# Patient Record
Sex: MALE | Race: WHITE | NOT HISPANIC OR LATINO | Employment: FULL TIME | URBAN - METROPOLITAN AREA
[De-identification: names, ages, dates, MRNs, and addresses within clinical notes are randomized per-mention and may not be internally consistent; named-entity substitution may affect disease eponyms.]

---

## 2018-04-18 ENCOUNTER — HOSPITAL ENCOUNTER (OUTPATIENT)
Dept: MRI IMAGING | Facility: HOSPITAL | Age: 33
Discharge: HOME | End: 2018-04-18
Attending: PSYCHIATRY & NEUROLOGY | Admitting: PSYCHIATRY & NEUROLOGY

## 2020-04-15 ENCOUNTER — TELEPHONE (OUTPATIENT)
Dept: FAMILY MEDICINE CLINIC | Facility: CLINIC | Age: 35
End: 2020-04-15

## 2020-04-17 DIAGNOSIS — R51.9 NONINTRACTABLE HEADACHE, UNSPECIFIED CHRONICITY PATTERN, UNSPECIFIED HEADACHE TYPE: Primary | ICD-10-CM

## 2020-04-18 RX ORDER — SUMATRIPTAN 25 MG/1
TABLET, FILM COATED ORAL
Qty: 60 TABLET | Refills: 2 | Status: SHIPPED | OUTPATIENT
Start: 2020-04-18 | End: 2022-03-29

## 2021-05-14 ENCOUNTER — OFFICE VISIT (OUTPATIENT)
Dept: FAMILY MEDICINE CLINIC | Facility: CLINIC | Age: 36
End: 2021-05-14

## 2021-05-14 VITALS
HEART RATE: 107 BPM | HEIGHT: 76 IN | OXYGEN SATURATION: 99 % | DIASTOLIC BLOOD PRESSURE: 75 MMHG | BODY MASS INDEX: 25.72 KG/M2 | RESPIRATION RATE: 18 BRPM | SYSTOLIC BLOOD PRESSURE: 110 MMHG | WEIGHT: 211.2 LBS | TEMPERATURE: 98.2 F

## 2021-05-14 DIAGNOSIS — R51.9 NONINTRACTABLE HEADACHE, UNSPECIFIED CHRONICITY PATTERN, UNSPECIFIED HEADACHE TYPE: Primary | ICD-10-CM

## 2021-05-14 PROCEDURE — 99213 OFFICE O/P EST LOW 20 MIN: CPT | Performed by: FAMILY MEDICINE

## 2021-05-14 RX ORDER — CEFPROZIL 500 MG/1
500 TABLET, FILM COATED ORAL 2 TIMES DAILY
Qty: 28 TABLET | Refills: 0 | Status: SHIPPED | OUTPATIENT
Start: 2021-05-14 | End: 2022-03-29

## 2021-05-14 RX ORDER — CYANOCOBALAMIN (VITAMIN B-12) 250 MCG
TABLET ORAL EVERY 24 HOURS
COMMUNITY
Start: 2018-04-04

## 2021-05-14 RX ORDER — SUMATRIPTAN 20 MG/1
1 SPRAY NASAL DAILY
Qty: 1 EACH | Refills: 12 | Status: SHIPPED | OUTPATIENT
Start: 2021-05-14 | End: 2022-03-29

## 2021-05-14 RX ORDER — MONTELUKAST SODIUM 10 MG/1
TABLET ORAL EVERY 24 HOURS
COMMUNITY
Start: 2018-04-04 | End: 2022-03-29

## 2021-05-14 RX ORDER — SUMATRIPTAN 50 MG/1
TABLET, FILM COATED ORAL
Qty: 9 TABLET | Refills: 12 | Status: SHIPPED | OUTPATIENT
Start: 2021-05-14 | End: 2022-05-16

## 2021-05-14 RX ORDER — TRIAMCINOLONE ACETONIDE 55 UG/1
SPRAY, METERED NASAL
COMMUNITY
Start: 2018-04-04 | End: 2022-03-29

## 2021-05-14 NOTE — PROGRESS NOTES
Subjective   Navin Ocampo is a 36 y.o. male.     Pt here for FMLA paperwork and imitrex med refill . Neurology workup neg for etio of headache. Ethmoid sinusitis noted on MRI 2918.  Headaches seasonal in April and occasionally  Oct    Headache   This is a chronic problem. The current episode started more than 1 year ago. The problem occurs daily. The problem has been rapidly worsening. The pain is located in the left unilateral region. The pain does not radiate. The pain is at a severity of 0/10. The patient is experiencing no pain. Associated symptoms include dizziness, nausea and scalp tenderness. Pertinent negatives include no abdominal pain, back pain, blurred vision, coughing, loss of balance, sinus pressure, swollen glands, visual change or vomiting. The symptoms are aggravated by alcohol. Treatments tried: imitrex.        The following portions of the patient's history were reviewed and updated as appropriate: allergies, current medications, past family history, past medical history, past social history, past surgical history and problem list.    Patient Active Problem List   Diagnosis   • Body mass index (BMI) of 25.0-25.9 in adult   • Headache       Current Outpatient Medications on File Prior to Visit   Medication Sig Dispense Refill   • cyanocobalamin (VITAMIN B-12) 250 MCG tablet Daily.     • montelukast (Singulair) 10 MG tablet Daily.     • SUMAtriptan (Imitrex) 25 MG tablet Take one tablet at onset of headache. May repeat dose one time in 2 hours if headache not relieved. 60 tablet 2   • Triamcinolone Acetonide (Nasacort Allergy 24HR) 55 MCG/ACT nasal inhaler NASACORT ALLERGY 24HR 55 MCG/ACT AERO       No current facility-administered medications on file prior to visit.     Current outpatient and discharge medications have been reconciled for the patient.  Reviewed by: Caleb Pickard MD      No Known Allergies    Review of Systems   HENT: Negative for sinus pressure and swollen glands.     Eyes: Negative for blurred vision.   Respiratory: Negative for cough.    Gastrointestinal: Positive for nausea. Negative for abdominal pain and vomiting.   Musculoskeletal: Negative for back pain.   Neurological: Positive for dizziness. Negative for loss of balance.     I have reviewed and confirmed the accuracy of the ROS as documented by the MA/LPN/RN Caleb Pickard MD    Objective   Vitals:    05/14/21 1354   BP: 110/75   Pulse: 107   Resp: 18   Temp: 98.2 °F (36.8 °C)   SpO2: 99%     Physical Exam  Constitutional:       Appearance: He is well-developed.   HENT:      Head: Normocephalic and atraumatic.      Right Ear: External ear normal.      Left Ear: External ear normal.      Nose: Nose normal.   Eyes:      Pupils: Pupils are equal, round, and reactive to light.   Cardiovascular:      Rate and Rhythm: Normal rate and regular rhythm.      Heart sounds: Normal heart sounds.   Pulmonary:      Effort: Pulmonary effort is normal.      Breath sounds: Normal breath sounds.   Abdominal:      General: Bowel sounds are normal.      Palpations: Abdomen is soft.   Musculoskeletal:         General: Normal range of motion.      Cervical back: Normal range of motion and neck supple.   Skin:     General: Skin is warm and dry.   Neurological:      Mental Status: He is alert and oriented to person, place, and time.   Psychiatric:         Behavior: Behavior normal.         Thought Content: Thought content normal.         Judgment: Judgment normal.         Assessment/Plan .  Problem List Items Addressed This Visit     Headache - Primary    Relevant Medications    SUMAtriptan (IMITREX) 20 MG/ACT nasal spray    SUMAtriptan (Imitrex) 50 MG tablet    cefprozil (CEFZIL) 500 MG tablet      Try empiric atbx  Follow-up in 2 weeks if not better.  Follow-up sooner for worsening symptoms or for any concerns.  Follow-up for routine health maintenance as directed.  Forms completed today     I wore protective equipment throughout  this patient encounter to include mask and eye protection. Hand hygiene was performed before donning protective equipment and after removal when leaving the room

## 2022-03-29 ENCOUNTER — OFFICE VISIT (OUTPATIENT)
Dept: FAMILY MEDICINE CLINIC | Facility: CLINIC | Age: 37
End: 2022-03-29

## 2022-03-29 VITALS
RESPIRATION RATE: 18 BRPM | HEART RATE: 78 BPM | HEIGHT: 76 IN | DIASTOLIC BLOOD PRESSURE: 66 MMHG | OXYGEN SATURATION: 99 % | TEMPERATURE: 97.8 F | BODY MASS INDEX: 25.94 KG/M2 | SYSTOLIC BLOOD PRESSURE: 115 MMHG | WEIGHT: 213 LBS

## 2022-03-29 DIAGNOSIS — G43.009 MIGRAINE WITHOUT AURA AND WITHOUT STATUS MIGRAINOSUS, NOT INTRACTABLE: ICD-10-CM

## 2022-03-29 DIAGNOSIS — R31.0 GROSS HEMATURIA: Primary | ICD-10-CM

## 2022-03-29 LAB
BILIRUB BLD-MCNC: NEGATIVE MG/DL
CLARITY, POC: ABNORMAL
COLOR UR: YELLOW
GLUCOSE UR STRIP-MCNC: NEGATIVE MG/DL
KETONES UR QL: NEGATIVE
LEUKOCYTE EST, POC: NEGATIVE
NITRITE UR-MCNC: NEGATIVE MG/ML
PH UR: 5 [PH] (ref 5–8)
PROT UR STRIP-MCNC: ABNORMAL MG/DL
RBC # UR STRIP: ABNORMAL /UL
SP GR UR: 1.01 (ref 1–1.03)
UROBILINOGEN UR QL: NORMAL

## 2022-03-29 PROCEDURE — 81003 URINALYSIS AUTO W/O SCOPE: CPT | Performed by: FAMILY MEDICINE

## 2022-03-29 PROCEDURE — 99213 OFFICE O/P EST LOW 20 MIN: CPT | Performed by: FAMILY MEDICINE

## 2022-03-29 RX ORDER — CIPROFLOXACIN 500 MG/1
500 TABLET, FILM COATED ORAL 2 TIMES DAILY
Qty: 20 TABLET | Refills: 0 | Status: SHIPPED | OUTPATIENT
Start: 2022-03-29 | End: 2022-04-12

## 2022-03-29 NOTE — ASSESSMENT & PLAN NOTE
Will treat with cipro, hold on CT scan since he is not having pain c/w renal stones.   Will check micro and send for culture. Added gonorrhea and chlamydia labs also  Recheck in 2 weeks

## 2022-03-29 NOTE — PROGRESS NOTES
Subjective   Navin Ocampo is a 36 y.o. male. Presents to [facility]    Chief Complaint   Patient presents with   • Blood in Urine       Blood in Urine  This is a new problem. The current episode started in the past 7 days. The problem has been gradually worsening since onset. He describes the hematuria as gross hematuria. He reports no clotting in his urine stream. The pain is mild. He describes his urine color as yellow (light red first few days). Irritative symptoms include frequency and urgency. Associated symptoms include bladder pain and dysuria. Pertinent negatives include no abdominal pain, bone pain, fever, flank pain, genital pain, inability to urinate, nausea or vomiting. He is sexually active (no new partners for 18 months).    No fever, no nausea. He has had some discomfort. He noticed blood in is urine last week. It has lightened up since then. Mostly painless.     I personally reviewed and updated the patient's allergies, medications, problem list, and past medical, surgical, social, and family history. I have reviewed and confirmed the accuracy of the History of Present Illness and Review of Symptoms as documented by the MA/LPN/RN. Monica Jiménez MD    Allergies:  No Known Allergies    Social History:  Social History     Socioeconomic History   • Marital status: Single   Tobacco Use   • Smoking status: Current Every Day Smoker     Packs/day: 1.00     Years: 15.00     Pack years: 15.00     Types: Cigarettes   • Smokeless tobacco: Never Used   Substance and Sexual Activity   • Alcohol use: Yes     Comment: occ   • Drug use: Yes     Types: Marijuana   • Sexual activity: Defer       Family History:  Family History   Problem Relation Age of Onset   • No Known Problems Mother    • Hypertension Father        Past Medical History :  Patient Active Problem List   Diagnosis   • Body mass index (BMI) of 25.0-25.9 in adult   • Headache   • Gross hematuria   • Migraine without aura and without  "status migrainosus, not intractable       Medication List:    Current Outpatient Medications:   •  cyanocobalamin (VITAMIN B-12) 250 MCG tablet, Daily., Disp: , Rfl:   •  SUMAtriptan (Imitrex) 50 MG tablet, Take one tablet at onset of headache. May repeat dose one time in 2 hours if headache not relieved., Disp: 9 tablet, Rfl: 12  •  ciprofloxacin (CIPRO) 500 MG tablet, Take 1 tablet by mouth 2 (Two) Times a Day., Disp: 20 tablet, Rfl: 0    Past Surgical History:  Past Surgical History:   Procedure Laterality Date   • HAND SURGERY Right 2009       Review of Systems:  Review of Systems   Constitutional: Negative for activity change and fever.   HENT: Negative for ear pain, rhinorrhea, sinus pressure and voice change.    Eyes: Negative for visual disturbance.   Respiratory: Negative for cough and shortness of breath.    Cardiovascular: Negative for chest pain.   Gastrointestinal: Negative for abdominal pain, diarrhea, nausea and vomiting.   Endocrine: Negative for cold intolerance and heat intolerance.   Genitourinary: Positive for dysuria, frequency, hematuria and urgency. Negative for flank pain.   Musculoskeletal: Negative for arthralgias.   Skin: Negative for rash.   Neurological: Negative for syncope.   Hematological: Does not bruise/bleed easily.   Psychiatric/Behavioral: Negative for depressed mood. The patient is not nervous/anxious.        Physical Exam:  Vital Signs:  Vital Signs:   /66   Pulse 78   Temp 97.8 °F (36.6 °C)   Resp 18   Ht 193 cm (76\")   Wt 96.6 kg (213 lb)   SpO2 99%   BMI 25.93 kg/m²     Result Review :   The following data was reviewed by: Monica Jiménez MD on 03/29/2022:           Brief Urine Lab Results  (Last result in the past 365 days)      Color   Clarity   Blood   Leuk Est   Nitrite   Protein   CREAT   Urine HCG        03/29/22 1352 Yellow   Cloudy   50 Jayden/ul   Negative   Negative   1+                 Physical Exam  Vitals reviewed.   Constitutional:       Appearance: " Normal appearance. He is well-developed.   HENT:      Head: Normocephalic and atraumatic.   Eyes:      General:         Right eye: No discharge.         Left eye: No discharge.   Cardiovascular:      Rate and Rhythm: Normal rate and regular rhythm.      Heart sounds: Normal heart sounds. No murmur heard.    No friction rub. No gallop.   Pulmonary:      Effort: Pulmonary effort is normal. No respiratory distress.      Breath sounds: Normal breath sounds. No wheezing or rales.   Abdominal:      General: Abdomen is flat. Bowel sounds are normal. There is no distension.      Palpations: Abdomen is soft. There is no mass.      Tenderness: There is no abdominal tenderness. There is no right CVA tenderness, left CVA tenderness, guarding or rebound.      Hernia: No hernia is present.   Skin:     General: Skin is warm and dry.      Findings: No rash.   Neurological:      Mental Status: He is alert and oriented to person, place, and time.      Coordination: Coordination normal.      Gait: Gait normal.   Psychiatric:         Behavior: Behavior is cooperative.         Assessment and Plan:  Problems Addressed this Visit        Genitourinary and Reproductive     Gross hematuria - Primary     Will treat with cipro, hold on CT scan since he is not having pain c/w renal stones.   Will check micro and send for culture. Added gonorrhea and chlamydia labs also  Recheck in 2 weeks           Relevant Medications    ciprofloxacin (CIPRO) 500 MG tablet    Other Relevant Orders    POCT urinalysis dipstick, multipro (Completed)    Urine Culture - Urine, Urine, Random Void    Urinalysis With Microscopic - Urine, Random Void    Chlamydia trachomatis, Neisseria gonorrhoeae, PCR - Urine, Urine, Clean Catch       Neuro    Migraine without aura and without status migrainosus, not intractable     He asked for referral to neurologist             Relevant Orders    Ambulatory Referral to Neurology      Diagnoses       Codes Comments    Gross  hematuria    -  Primary ICD-10-CM: R31.0  ICD-9-CM: 599.71     Migraine without aura and without status migrainosus, not intractable     ICD-10-CM: G43.009  ICD-9-CM: 346.10            An After Visit Summary and PPPS were given to the patient.       I wore protective equipment throughout this patient encounter to include mask. Hand hygiene was performed before donning protective equipment and after removal when leaving the room.

## 2022-03-30 LAB
APPEARANCE UR: CLEAR
BACTERIA #/AREA URNS HPF: NORMAL /[HPF]
BILIRUB UR QL STRIP: NEGATIVE
CASTS URNS QL MICRO: NORMAL /LPF
COLOR UR: YELLOW
EPI CELLS #/AREA URNS HPF: NORMAL /HPF (ref 0–10)
GLUCOSE UR QL STRIP: NEGATIVE
HGB UR QL STRIP: ABNORMAL
KETONES UR QL STRIP: NEGATIVE
LEUKOCYTE ESTERASE UR QL STRIP: NEGATIVE
MICRO URNS: ABNORMAL
NITRITE UR QL STRIP: NEGATIVE
PH UR STRIP: 6 [PH] (ref 5–7.5)
PROT UR QL STRIP: NEGATIVE
RBC #/AREA URNS HPF: NORMAL /HPF (ref 0–2)
SP GR UR STRIP: 1.01 (ref 1–1.03)
UROBILINOGEN UR STRIP-MCNC: 0.2 MG/DL (ref 0.2–1)
WBC #/AREA URNS HPF: NORMAL /HPF (ref 0–5)

## 2022-03-31 LAB
BACTERIA UR CULT: NO GROWTH
BACTERIA UR CULT: NORMAL
C TRACH RRNA SPEC QL NAA+PROBE: NEGATIVE
N GONORRHOEA RRNA SPEC QL NAA+PROBE: NEGATIVE

## 2022-04-12 ENCOUNTER — OFFICE VISIT (OUTPATIENT)
Dept: FAMILY MEDICINE CLINIC | Facility: CLINIC | Age: 37
End: 2022-04-12

## 2022-04-12 VITALS
DIASTOLIC BLOOD PRESSURE: 60 MMHG | OXYGEN SATURATION: 98 % | HEART RATE: 74 BPM | WEIGHT: 209 LBS | HEIGHT: 76 IN | TEMPERATURE: 97.8 F | SYSTOLIC BLOOD PRESSURE: 92 MMHG | BODY MASS INDEX: 25.45 KG/M2 | RESPIRATION RATE: 18 BRPM

## 2022-04-12 DIAGNOSIS — R31.0 GROSS HEMATURIA: Primary | ICD-10-CM

## 2022-04-12 LAB
BILIRUB BLD-MCNC: NEGATIVE MG/DL
CLARITY, POC: CLEAR
COLOR UR: YELLOW
GLUCOSE UR STRIP-MCNC: NEGATIVE MG/DL
KETONES UR QL: NEGATIVE
LEUKOCYTE EST, POC: NEGATIVE
NITRITE UR-MCNC: NEGATIVE MG/ML
PH UR: 6.5 [PH] (ref 5–8)
PROT UR STRIP-MCNC: NEGATIVE MG/DL
RBC # UR STRIP: ABNORMAL /UL
SP GR UR: 1.01 (ref 1–1.03)
UROBILINOGEN UR QL: NORMAL

## 2022-04-12 PROCEDURE — 99213 OFFICE O/P EST LOW 20 MIN: CPT | Performed by: FAMILY MEDICINE

## 2022-04-12 PROCEDURE — 81002 URINALYSIS NONAUTO W/O SCOPE: CPT | Performed by: FAMILY MEDICINE

## 2022-04-12 NOTE — PROGRESS NOTES
Subjective   Navin Ocampo is a 36 y.o. male.   No chief complaint on file.      History of hematuria workup approx 5 years ago     Blood in Urine  This is a recurrent problem. The current episode started 1 to 4 weeks ago. The problem has been gradually worsening since onset. He describes the hematuria as gross hematuria. He reports no clotting in his urine stream. The pain is mild. He describes his urine color as yellow (light red first few days). Irritative symptoms do not include frequency or urgency. Associated symptoms include bladder pain. Pertinent negatives include no abdominal pain, bone pain, dysuria, fever, flank pain, genital pain, inability to urinate, nausea or vomiting. He is sexually active (no new partners for 18 months).        The following portions of the patient's history were reviewed and updated as appropriate: allergies, current medications, past family history, past medical history, past social history, past surgical history and problem list.    Patient Active Problem List   Diagnosis   • Body mass index (BMI) of 25.0-25.9 in adult   • Headache   • Gross hematuria   • Migraine without aura and without status migrainosus, not intractable       Current Outpatient Medications on File Prior to Visit   Medication Sig Dispense Refill   • cyanocobalamin (VITAMIN B-12) 250 MCG tablet Daily.     • SUMAtriptan (Imitrex) 50 MG tablet Take one tablet at onset of headache. May repeat dose one time in 2 hours if headache not relieved. 9 tablet 12   • [DISCONTINUED] ciprofloxacin (CIPRO) 500 MG tablet Take 1 tablet by mouth 2 (Two) Times a Day. 20 tablet 0     No current facility-administered medications on file prior to visit.     Current outpatient and discharge medications have been reconciled for the patient.  Reviewed by: Caleb Pickard MD      No Known Allergies    Review of Systems   Constitutional: Negative for fever.   Gastrointestinal: Negative for abdominal pain, nausea and vomiting.  "  Genitourinary: Negative for dysuria, flank pain, frequency, hematuria and urgency.     I have reviewed and confirmed the accuracy of the ROS as documented by the MA/LPN/RN Caleb Pickard MD    Objective   Visit Vitals  BP 92/60 (BP Location: Right arm, Patient Position: Sitting, Cuff Size: Adult)   Pulse 74   Temp 97.8 °F (36.6 °C)   Resp 18   Ht 193 cm (76\")   Wt 94.8 kg (209 lb)   SpO2 98%   BMI 25.44 kg/m²       Physical Exam  Constitutional:       Appearance: He is well-developed.   HENT:      Head: Normocephalic and atraumatic.      Right Ear: External ear normal.      Left Ear: External ear normal.      Nose: Nose normal.   Eyes:      Pupils: Pupils are equal, round, and reactive to light.   Cardiovascular:      Rate and Rhythm: Normal rate and regular rhythm.      Heart sounds: Normal heart sounds.   Pulmonary:      Effort: Pulmonary effort is normal.      Breath sounds: Normal breath sounds.   Abdominal:      General: Bowel sounds are normal.      Palpations: Abdomen is soft.   Musculoskeletal:         General: Normal range of motion.      Cervical back: Normal range of motion and neck supple.   Skin:     General: Skin is warm and dry.   Neurological:      Mental Status: He is alert and oriented to person, place, and time.   Psychiatric:         Behavior: Behavior normal.         Thought Content: Thought content normal.         Judgment: Judgment normal.       Derm Physical Exam    Assessment/Plan .    Diagnoses and all orders for this visit:    1. Gross hematuria (Primary)  Comments:  recheck ua in 1 month. Will get urology records   Orders:  -     POCT urinalysis dipstick, manual  -     Urinalysis With Microscopic - Urine, Clean Catch; Future     Findings discussed. All questions answered.  Medication and medication adverse effects discussed.  Drug education given and explained to patient. Patient verbalized understanding.  Follow-up for routine health maintenance as directed   Follow up in 1 " month for reevaluation, sooner for concerns.      I wore protective equipment throughout this patient encounter to include mask and gloves. Hand hygiene was performed before donning protective equipment and after removal when leaving the room

## 2022-05-16 DIAGNOSIS — R51.9 NONINTRACTABLE HEADACHE, UNSPECIFIED CHRONICITY PATTERN, UNSPECIFIED HEADACHE TYPE: ICD-10-CM

## 2022-05-16 RX ORDER — SUMATRIPTAN 50 MG/1
TABLET, FILM COATED ORAL
Qty: 9 TABLET | Refills: 12 | Status: SHIPPED | OUTPATIENT
Start: 2022-05-16 | End: 2022-07-11 | Stop reason: SDUPTHER

## 2022-05-18 LAB
APPEARANCE UR: CLEAR
BACTERIA #/AREA URNS HPF: NORMAL /[HPF]
BILIRUB UR QL STRIP: NEGATIVE
CASTS URNS QL MICRO: NORMAL /LPF
COLOR UR: YELLOW
EPI CELLS #/AREA URNS HPF: NORMAL /HPF (ref 0–10)
GLUCOSE UR QL STRIP: NEGATIVE
HGB UR QL STRIP: ABNORMAL
KETONES UR QL STRIP: NEGATIVE
LEUKOCYTE ESTERASE UR QL STRIP: NEGATIVE
MICRO URNS: ABNORMAL
NITRITE UR QL STRIP: NEGATIVE
PH UR STRIP: 6.5 [PH] (ref 5–7.5)
PROT UR QL STRIP: NEGATIVE
RBC #/AREA URNS HPF: NORMAL /HPF (ref 0–2)
SP GR UR STRIP: 1.01 (ref 1–1.03)
UROBILINOGEN UR STRIP-MCNC: 0.2 MG/DL (ref 0.2–1)
WBC #/AREA URNS HPF: NORMAL /HPF (ref 0–5)

## 2022-06-06 ENCOUNTER — TELEPHONE (OUTPATIENT)
Dept: FAMILY MEDICINE CLINIC | Facility: CLINIC | Age: 37
End: 2022-06-06

## 2022-06-06 NOTE — TELEPHONE ENCOUNTER
Caller: Navin Ocampo    Relationship: Self    Best call back number: [phone]    What test was performed: URINE SAMPLE    When was the test performed: 5/17/22    Where was the test performed: [facility]    Additional notes: PATIENT CALLED TO CHECK ON RESULTS, NEVER HEARD ANYTHING BACK AFTER COMPLETING SAMPLE.    PLEASE ADVISE

## 2022-06-13 ENCOUNTER — OFFICE VISIT (OUTPATIENT)
Dept: FAMILY MEDICINE CLINIC | Facility: CLINIC | Age: 37
End: 2022-06-13

## 2022-06-13 VITALS
HEIGHT: 76 IN | DIASTOLIC BLOOD PRESSURE: 68 MMHG | WEIGHT: 207.8 LBS | RESPIRATION RATE: 18 BRPM | SYSTOLIC BLOOD PRESSURE: 106 MMHG | HEART RATE: 79 BPM | TEMPERATURE: 97.3 F | BODY MASS INDEX: 25.31 KG/M2 | OXYGEN SATURATION: 98 %

## 2022-06-13 DIAGNOSIS — J30.89 NON-SEASONAL ALLERGIC RHINITIS DUE TO OTHER ALLERGIC TRIGGER: ICD-10-CM

## 2022-06-13 DIAGNOSIS — R51.9 INTRACTABLE HEADACHE, UNSPECIFIED CHRONICITY PATTERN, UNSPECIFIED HEADACHE TYPE: Primary | ICD-10-CM

## 2022-06-13 PROCEDURE — 99213 OFFICE O/P EST LOW 20 MIN: CPT | Performed by: FAMILY MEDICINE

## 2022-06-13 RX ORDER — AZELASTINE HCL 205.5 UG/1
1 SPRAY NASAL 2 TIMES DAILY
Qty: 30 ML | Refills: 5 | Status: SHIPPED | OUTPATIENT
Start: 2022-06-13

## 2022-06-13 RX ORDER — MONTELUKAST SODIUM 10 MG/1
10 TABLET ORAL NIGHTLY
Qty: 30 TABLET | Refills: 12 | Status: SHIPPED | OUTPATIENT
Start: 2022-06-13 | End: 2022-07-05

## 2022-06-13 NOTE — PROGRESS NOTES
Subjective   Navin Ocampo is a 37 y.o. male.   Chief Complaint   Patient presents with   • Headache       Headache  Headache pattern:  Headache sometimes there, sometimes not at all  Recent changes:  Headaches come more often than they used to and headaches last longer than they used to  Frequency:  More than 3 per week  Providers seen:  Primary care provider  Do headaches wake patient from sleep?: Yes    Days of the week symptoms are worse:  No specific day of the week  Quality:  Throbbing and squeezing  Aggravating factors:  Activity, light and noise  Abortive medications tried:  Sumatriptan  Treatments tried: imitrex.       The following portions of the patient's history were reviewed and updated as appropriate: allergies, current medications, past family history, past medical history, past social history, past surgical history and problem list.    Patient Active Problem List   Diagnosis   • Body mass index (BMI) of 25.0-25.9 in adult   • Headache   • Gross hematuria   • Migraine without aura and without status migrainosus, not intractable       Current Outpatient Medications on File Prior to Visit   Medication Sig Dispense Refill   • cyanocobalamin (VITAMIN B-12) 250 MCG tablet Daily.     • SUMAtriptan (IMITREX) 50 MG tablet TAKE ONE TABLET AT ONSET OF HEADACHE. MAY REPEAT DOSE ONE TIME IN 2 HOURS IF HEADACHE NOT RELIEVED. 9 tablet 12     No current facility-administered medications on file prior to visit.     Current outpatient and discharge medications have been reconciled for the patient.  Reviewed by: Caleb Pickard MD      No Known Allergies    Review of Systems   Constitutional: Negative for activity change, appetite change, fatigue and fever.   HENT: Positive for congestion and sinus pressure. Negative for ear pain, swollen glands and voice change.    Eyes: Negative for visual disturbance.   Respiratory: Negative for shortness of breath and wheezing.    Cardiovascular: Negative for chest pain and  "leg swelling.   Gastrointestinal: Negative for abdominal pain, blood in stool, constipation, diarrhea, nausea and vomiting.   Endocrine: Negative for polydipsia and polyuria.   Genitourinary: Negative for dysuria, frequency and hematuria.   Musculoskeletal: Negative for joint swelling, neck pain and neck stiffness.   Skin: Negative for rash and wound.   Neurological: Negative for weakness, numbness and headache.   Psychiatric/Behavioral: Negative for suicidal ideas and depressed mood.     I have reviewed and confirmed the accuracy of the ROS as documented by the MA/LPN/RN Caleb Pickard MD    Objective   Visit Vitals  /68 (BP Location: Right arm, Patient Position: Sitting, Cuff Size: Adult)   Pulse 79   Temp 97.3 °F (36.3 °C)   Resp 18   Ht 193 cm (76\")   Wt 94.3 kg (207 lb 12.8 oz)   SpO2 98%   BMI 25.29 kg/m²       Physical Exam  Constitutional:       Appearance: He is well-developed.   HENT:      Head: Normocephalic and atraumatic.      Right Ear: External ear normal.      Left Ear: External ear normal.      Nose:      Left Sinus: Maxillary sinus tenderness and frontal sinus tenderness present.   Eyes:      Pupils: Pupils are equal, round, and reactive to light.   Cardiovascular:      Rate and Rhythm: Normal rate and regular rhythm.      Heart sounds: Normal heart sounds.   Pulmonary:      Effort: Pulmonary effort is normal.      Breath sounds: Normal breath sounds.   Abdominal:      General: Bowel sounds are normal.      Palpations: Abdomen is soft.   Musculoskeletal:         General: Normal range of motion.      Cervical back: Normal range of motion and neck supple.   Skin:     General: Skin is warm and dry.   Neurological:      Mental Status: He is alert and oriented to person, place, and time.   Psychiatric:         Behavior: Behavior normal.         Thought Content: Thought content normal.         Judgment: Judgment normal.       Derm Physical Exam    Diagnoses and all orders for this " visit:    1. Intractable headache, unspecified chronicity pattern, unspecified headache type (Primary)  Comments:  ? cluster. Consider AR trigger    2. Non-seasonal allergic rhinitis due to other allergic trigger  -     montelukast (SINGULAIR) 10 MG tablet; Take 1 tablet by mouth Every Night.  Dispense: 30 tablet; Refill: 12  -     azelastine (ASTEPRO) 0.15 % solution nasal spray; 1 spray into the nostril(s) as directed by provider 2 (Two) Times a Day.  Dispense: 30 mL; Refill: 5     Findings discussed. All questions answered.  Medication and medication adverse effects discussed.  Drug education given and explained to patient. Patient verbalized understanding.  Consider gabapentin if sx persist.     I wore protective equipment throughout this patient encounter to include mask and eye protection. Hand hygiene was performed before donning protective equipment and after removal when leaving the room

## 2022-07-05 DIAGNOSIS — J30.89 NON-SEASONAL ALLERGIC RHINITIS DUE TO OTHER ALLERGIC TRIGGER: ICD-10-CM

## 2022-07-05 RX ORDER — MONTELUKAST SODIUM 10 MG/1
TABLET ORAL
Qty: 30 TABLET | Refills: 12 | Status: SHIPPED | OUTPATIENT
Start: 2022-07-05

## 2022-07-07 NOTE — PROGRESS NOTES
Subjective:     Patient ID: Navin Ocampo is a 37 y.o. male.    CHIEF COMPLAINT:Cluster Migriane    History of Present Illness: Mr. Ocampo is a 37 year old  male with a BMI of 25.12 who presented alone. The patient states that he started having episodic headaches left severe temporal stabbing pain, with tearing and rhinorrhea on the left side from April to July since 2018.  He reports that he has had allergy testing and trials with several allergy meds with no change.        Sharp shooting left temporal   Complaint: Cluster  Onset:   Aura: left side of face tingle 10-15 min prior to headache is severe  Location:  Always left temporal area  Quality: Sharp shooting   Severity: 10/10  Duration:   20-30 min   Frequency:  April  Through June, July  Timing: Sporadic during day and night   Context: no known triggers  Modifying factors: Imitrex  Associated Signs and symptoms:  Rhinorrhea and Tearing in Left Temporal  Current meds: Imitrex 50mg       PROCEDURE:  MR BRAIN WO (ROUTINE)   HISTORY:  h/a left-sided headache  IMPRESSION:  1. No acute intracranial abnormality.  2. Chronic right frontal and bilateral ethmoid sinusitis.  Farhan Singh MD         The following portions of the patient's history were reviewed and updated as appropriate: allergies, current medications, past family history, past medical history, past social history, past surgical history and problem list.      Family History   Problem Relation Age of Onset   • No Known Problems Mother    • Hypertension Father        Past Medical History:   Diagnosis Date   • Headaches, cluster        Social History     Socioeconomic History   • Marital status: Single   Tobacco Use   • Smoking status: Current Every Day Smoker     Packs/day: 1.00     Years: 15.00     Pack years: 15.00     Types: Cigarettes, Cigarettes   • Smokeless tobacco: Never Used   Substance and Sexual Activity   • Alcohol use: Not Currently     Comment: occ   • Drug use: Yes     Types:  Marijuana   • Sexual activity: Yes         Current Outpatient Medications:   •  azelastine (ASTEPRO) 0.15 % solution nasal spray, 1 spray into the nostril(s) as directed by provider 2 (Two) Times a Day., Disp: 30 mL, Rfl: 5  •  cyanocobalamin (VITAMIN B-12) 250 MCG tablet, Daily., Disp: , Rfl:   •  montelukast (SINGULAIR) 10 MG tablet, TAKE 1 TABLET BY MOUTH EVERY DAY AT NIGHT, Disp: 30 tablet, Rfl: 12  •  SUMAtriptan (IMITREX) 50 MG tablet, Take one tablet at onset of headache. May repeat dose one time in 2 hours if headache not relieved., Disp: 18 tablet, Rfl: 12  •  verapamil (CALAN) 40 MG tablet, Take 1 pill qd for 2 weeks, then 2 tabs qd, Disp: 60 tablet, Rfl: 3    Review of Systems   Constitutional: Positive for appetite change and chills.   HENT: Positive for congestion, facial swelling, sinus pressure and sneezing.    Eyes: Positive for pain, redness and visual disturbance.   Musculoskeletal: Positive for back pain, neck pain and neck stiffness.   Skin: Positive for color change.   Neurological: Positive for dizziness, weakness, light-headedness, numbness and headaches.   Psychiatric/Behavioral: Positive for agitation, behavioral problems, dysphoric mood and sleep disturbance. The patient is nervous/anxious and is hyperactive.         I have reviewed ROS completed by medical assistant.     Objective:    Neurologic Exam     Mental Status   Oriented to person, place, and time.   Speech: speech is normal     Cranial Nerves     CN III, IV, VI   Pupils are equal, round, and reactive to light.    Gait, Coordination, and Reflexes     Gait  Gait: normal    Coordination   Finger to nose coordination: normal  Tandem walking coordination: normal    Reflexes   Right brachioradialis: 1+  Left brachioradialis: 1+  Right biceps: 1+  Left biceps: 1+  Right triceps: 1+  Left triceps: 1+  Right patellar: 1+  Left patellar: 1+  Right achilles: 2+  Left achilles: 2+      Physical Exam  Vitals and nursing note reviewed.    Constitutional:       Appearance: Normal appearance. He is well-developed.   HENT:      Head: Normocephalic.      Nose: Nose normal.      Mouth/Throat:      Mouth: Mucous membranes are moist.   Eyes:      General: Lids are normal. No visual field deficit.     Extraocular Movements: Extraocular movements intact.      Pupils: Pupils are equal, round, and reactive to light.   Cardiovascular:      Rate and Rhythm: Normal rate and regular rhythm.      Heart sounds: Normal heart sounds, S1 normal and S2 normal.   Pulmonary:      Effort: Pulmonary effort is normal.      Breath sounds: Normal breath sounds.   Musculoskeletal:         General: Normal range of motion.      Cervical back: Full passive range of motion without pain.      Comments: 5/5 all extremities    Skin:     General: Skin is warm.   Neurological:      General: No focal deficit present.      Mental Status: He is alert and oriented to person, place, and time.      Cranial Nerves: Cranial nerves are intact. No cranial nerve deficit, dysarthria or facial asymmetry.      Sensory: Sensation is intact. No sensory deficit.      Motor: Motor function is intact. No weakness, tremor, atrophy, abnormal muscle tone, seizure activity or pronator drift.      Coordination: Coordination is intact. Romberg sign negative. Coordination normal. Finger-Nose-Finger Test and Heel to Shin Test normal. Rapid alternating movements normal.      Gait: Gait is intact. Gait and tandem walk normal.      Deep Tendon Reflexes: Reflexes normal. Babinski sign absent on the right side. Babinski sign absent on the left side.      Reflex Scores:       Tricep reflexes are 1+ on the right side and 1+ on the left side.       Bicep reflexes are 1+ on the right side and 1+ on the left side.       Brachioradialis reflexes are 1+ on the right side and 1+ on the left side.       Patellar reflexes are 1+ on the right side and 1+ on the left side.       Achilles reflexes are 2+ on the right side and 2+  on the left side.  Psychiatric:         Attention and Perception: Attention normal.         Mood and Affect: Mood normal.         Speech: Speech normal.         Behavior: Behavior normal. Behavior is cooperative.         Assessment/Plan:    Diagnoses and all orders for this visit:    1. Migraine-cluster headache syndrome (Primary)  Comments:  Oxygen (100 percent) is administered via a nonrebreathing facial mask with a flow rate of at least 12 L/minute with the patient in a sitting, upright position [  Orders:  -     verapamil (CALAN) 40 MG tablet; Take 1 pill qd for 2 weeks, then 2 tabs qd  Dispense: 60 tablet; Refill: 3  -     Oxygen Therapy  -     SUMAtriptan (IMITREX) 50 MG tablet; Take one tablet at onset of headache. May repeat dose one time in 2 hours if headache not relieved.  Dispense: 18 tablet; Refill: 12        Return in about 3 months (around 10/11/2022) for Next scheduled follow up Rosi Potts.    I spent 53 minutes caring for Navin on this date of service. This time includes time spent by me in the following activities: obtaining and/or reviewing a separately obtained history, performing a medically appropriate examination and/or evaluation, counseling and educating the patient/family/caregiver, ordering medications, tests, or procedures, referring and communicating with other health care professionals and documenting information in the medical record.      This document has been electronically signed by Rosi BAER on July 11, 2022 13:52 EDT

## 2022-07-11 ENCOUNTER — OFFICE VISIT (OUTPATIENT)
Dept: NEUROLOGY | Facility: CLINIC | Age: 37
End: 2022-07-11

## 2022-07-11 VITALS
SYSTOLIC BLOOD PRESSURE: 120 MMHG | TEMPERATURE: 98.4 F | DIASTOLIC BLOOD PRESSURE: 74 MMHG | BODY MASS INDEX: 25.13 KG/M2 | HEART RATE: 58 BPM | HEIGHT: 76 IN | WEIGHT: 206.4 LBS

## 2022-07-11 DIAGNOSIS — G44.009 MIGRAINE-CLUSTER HEADACHE SYNDROME: Primary | ICD-10-CM

## 2022-07-11 PROCEDURE — 99204 OFFICE O/P NEW MOD 45 MIN: CPT | Performed by: NURSE PRACTITIONER

## 2022-07-11 RX ORDER — SUMATRIPTAN 50 MG/1
TABLET, FILM COATED ORAL
Qty: 18 TABLET | Refills: 12 | Status: SHIPPED | OUTPATIENT
Start: 2022-07-11 | End: 2022-11-22 | Stop reason: SDUPTHER

## 2022-07-11 RX ORDER — VERAPAMIL HYDROCHLORIDE 40 MG/1
TABLET ORAL
Qty: 60 TABLET | Refills: 3 | Status: SHIPPED | OUTPATIENT
Start: 2022-07-11 | End: 2022-08-04

## 2022-08-03 DIAGNOSIS — G44.009 MIGRAINE-CLUSTER HEADACHE SYNDROME: ICD-10-CM

## 2022-08-04 RX ORDER — VERAPAMIL HYDROCHLORIDE 40 MG/1
TABLET ORAL
Qty: 60 TABLET | Refills: 3 | Status: SHIPPED | OUTPATIENT
Start: 2022-08-04 | End: 2022-11-22 | Stop reason: SDUPTHER

## 2022-11-18 NOTE — PROGRESS NOTES
Subjective: Cluster Migriane    Patient ID: Navin Ocampo is a 37 y.o. male.    History of Present Illness: Mr. Ocampo is a very pleasant 37-year-old  male with a BMI of 25.32 who presented today for a follow-up on cluster migraine.  The patient states he has been doing very well.  He states he has had 1 cluster since last seen which was in July 2022.  He is currently treated with verapamil, sumatriptan hand and oxygen 100% through nonrebreather mask at a flow rate of 12 L/min.  He states the 1 clusters that he had lasted a few minutes and totally resolved.  He states that his primary cluster time.  Is April through July and states that that will be the biggest test for his current medication regimen.  He was notified that should his clusters became more problematic there is another medication we can add to the treatment schedule which is Emgality 300 mg once a month.  He was given that information so he can research the drug prior to the possible adding of that in April.       MRI Brain   IMPRESSION:  1. No acute intracranial abnormality.  2. Chronic right frontal and bilateral ethmoid sinusitis.  Farhan Singh MD   DS: 04/18/2018 17:09  Report ID: [id number]  cc: FREDY FORMAN  FINAL AUTHENTICATED COPY      The following portions of the patient's history were reviewed and updated as appropriate: allergies, current medications, past family history, past medical history, past social history, past surgical history and problem list.    Family History   Problem Relation Age of Onset   • No Known Problems Mother    • Hypertension Father        Past Medical History:   Diagnosis Date   • Headaches, cluster        Social History     Socioeconomic History   • Marital status: Single   Tobacco Use   • Smoking status: Every Day     Packs/day: 1.00     Years: 15.00     Pack years: 15.00     Types: Cigarettes   • Smokeless tobacco: Never   Vaping Use   •  Vaping Use: Never used   Substance and Sexual Activity   • Alcohol use: Not Currently     Comment: occ   • Drug use: Yes     Types: Marijuana   • Sexual activity: Yes         Current Outpatient Medications:   •  cyanocobalamin (VITAMIN B-12) 250 MCG tablet, Daily., Disp: , Rfl:   •  SUMAtriptan (IMITREX) 50 MG tablet, Take one tablet at onset of headache. May repeat dose one time in 2 hours if headache not relieved., Disp: 18 tablet, Rfl: 12  •  verapamil (CALAN) 40 MG tablet, TAKE 1 TABLET BY MOUTH DAILY FOR 2 WEEKS, THEN 2 TABLETS DAILY, Disp: 60 tablet, Rfl: 3  •  azelastine (ASTEPRO) 0.15 % solution nasal spray, 1 spray into the nostril(s) as directed by provider 2 (Two) Times a Day., Disp: 30 mL, Rfl: 5  •  montelukast (SINGULAIR) 10 MG tablet, TAKE 1 TABLET BY MOUTH EVERY DAY AT NIGHT, Disp: 30 tablet, Rfl: 12    Review of Systems   Constitutional: Negative.    HENT: Negative.    Eyes: Negative.    Respiratory: Positive for cough.    Endocrine: Negative.    Genitourinary: Negative.    Musculoskeletal: Positive for back pain.   Hematological: Negative.    Psychiatric/Behavioral: Negative.           I have reviewed ROS completed by medical assistant.     Objective:    Neurologic Exam     Mental Status   Speech: speech is normal     Cranial Nerves   Cranial nerves II through XII intact.     CN III, IV, VI   Pupils are equal, round, and reactive to light.    Gait, Coordination, and Reflexes     Gait  Gait: normal      Physical Exam  Vitals reviewed.   Constitutional:       Appearance: Normal appearance. He is well-developed and normal weight.   HENT:      Head: Normocephalic and atraumatic.      Right Ear: Hearing normal.      Left Ear: Hearing normal.      Nose: Nose normal.      Mouth/Throat:      Lips: Pink.      Mouth: Mucous membranes are moist.   Eyes:      General: Lids are normal. No visual field deficit.     Pupils: Pupils are equal, round, and reactive to light.   Cardiovascular:      Rate and Rhythm:  Normal rate.   Pulmonary:      Effort: Pulmonary effort is normal.   Musculoskeletal:         General: Normal range of motion.      Cervical back: Normal range of motion.   Skin:     General: Skin is warm and dry.   Neurological:      Mental Status: He is alert.      Cranial Nerves: Cranial nerves 2-12 are intact. No cranial nerve deficit or facial asymmetry.      Gait: Gait is intact.   Psychiatric:         Attention and Perception: Attention normal.         Mood and Affect: Mood normal.         Speech: Speech normal.         Behavior: Behavior normal. Behavior is cooperative.         Assessment/Plan:    Discussion: The patient will be continued on his current medication regimen for cluster migraine.  Should this not stop a breakthrough cluster he is to contact the clinic.  He will be scheduled back in April of next year to be seen during his usual seasonal outbreak of clusters to make sure that this therapy is efficient.  I did discuss possible addition of Emgality if needed in the future.  The patient was in agreement.      Diagnoses and all orders for this visit:    1. Migraine-cluster headache syndrome  Comments:  Oxygen (100 percent) is administered via a nonrebreathing facial mask with a flow rate of at least 12 L/minute with the patient in a sitting, upright position [  Orders:  -     SUMAtriptan (IMITREX) 50 MG tablet; Take one tablet at onset of headache. May repeat dose one time in 2 hours if headache not relieved.  Dispense: 18 tablet; Refill: 12  -     verapamil (CALAN) 40 MG tablet; TAKE 1 TABLET BY MOUTH DAILY FOR 2 WEEKS, THEN 2 TABLETS DAILY  Dispense: 60 tablet; Refill: 3          Return in about 5 months (around 4/22/2023) for Rosi Potts .     I spent 20 minutes caring for Navin on this date of service. This time includes time spent by me in the following activities: reviewing tests, performing a medically appropriate examination and/or evaluation, counseling and educating the  patient/family/caregiver, ordering medications, tests, or procedures, referring and communicating with other health care professionals and documenting information in the medical record.      This document has been electronically signed by KEYUR De Los Santos on November 22, 2022 16:35 EST

## 2022-11-22 ENCOUNTER — OFFICE VISIT (OUTPATIENT)
Dept: NEUROLOGY | Facility: CLINIC | Age: 37
End: 2022-11-22

## 2022-11-22 VITALS
SYSTOLIC BLOOD PRESSURE: 114 MMHG | HEIGHT: 76 IN | WEIGHT: 208 LBS | HEART RATE: 71 BPM | TEMPERATURE: 98.2 F | DIASTOLIC BLOOD PRESSURE: 71 MMHG | BODY MASS INDEX: 25.33 KG/M2

## 2022-11-22 DIAGNOSIS — G44.009 MIGRAINE-CLUSTER HEADACHE SYNDROME: ICD-10-CM

## 2022-11-22 PROCEDURE — 99213 OFFICE O/P EST LOW 20 MIN: CPT | Performed by: NURSE PRACTITIONER

## 2022-11-22 RX ORDER — SUMATRIPTAN 50 MG/1
TABLET, FILM COATED ORAL
Qty: 18 TABLET | Refills: 12 | Status: SHIPPED | OUTPATIENT
Start: 2022-11-22

## 2022-11-22 RX ORDER — VERAPAMIL HYDROCHLORIDE 40 MG/1
TABLET ORAL
Qty: 60 TABLET | Refills: 3 | Status: SHIPPED | OUTPATIENT
Start: 2022-11-22

## 2023-04-17 NOTE — PROGRESS NOTES
Subjective: migraines    Patient ID: Navin Ocampo is a 37 y.o. male.    History of Present Illness Mr. Ocampo is here for a the patient is here for follow up on cluster migraines.  He is currently treated with sumatriptan  50 to 100 mg, verapamil  80 mg and 100% oxygen per a nonrebreather mask at a flow rate of 12 L/min for 15 minutes for each cluster migraine.  The patient states he is very pleased with his control of cluster migraines.  He states that if he is out somewhere he does use the Imitrex however if he is at home he uses them 100% oxygen.  He reports that his usual seasonal increase in clusters is in April to June.  Currently this year he has done fairly well.          The following portions of the patient's history were reviewed and updated as appropriate: allergies, current medications, past family history, past medical history, past social history, past surgical history and problem list.    Family History   Problem Relation Age of Onset   • No Known Problems Mother    • Hypertension Father        Past Medical History:   Diagnosis Date   • Headaches, cluster        Social History     Socioeconomic History   • Marital status: Single   Tobacco Use   • Smoking status: Every Day     Packs/day: 1.00     Years: 15.00     Pack years: 15.00     Types: Cigarettes   • Smokeless tobacco: Never   Vaping Use   • Vaping Use: Never used   Substance and Sexual Activity   • Alcohol use: Not Currently     Comment: occ   • Drug use: Yes     Types: Marijuana   • Sexual activity: Yes         Current Outpatient Medications:   •  azelastine (ASTEPRO) 0.15 % solution nasal spray, 1 spray into the nostril(s) as directed by provider 2 (Two) Times a Day., Disp: 30 mL, Rfl: 5  •  cyanocobalamin (VITAMIN B-12) 250 MCG tablet, Daily., Disp: , Rfl:   •  montelukast (SINGULAIR) 10 MG tablet, TAKE 1 TABLET BY MOUTH EVERY DAY AT NIGHT, Disp: 30 tablet, Rfl: 12  •  SUMAtriptan (IMITREX) 50 MG tablet, Take one tablet at onset of  headache. May repeat dose one time in 2 hours if headache not relieved., Disp: 18 tablet, Rfl: 12  •  verapamil (CALAN) 40 MG tablet, TAKE 1 TABLET BY MOUTH DAILY FOR 2 WEEKS, THEN 2 TABLETS DAILY, Disp: 60 tablet, Rfl: 11    Review of Systems   HENT: Positive for congestion.    Musculoskeletal: Positive for back pain.   Neurological: Positive for headaches.   All other systems reviewed and are negative.         I have reviewed ROS completed by medical assistant.     Objective:    Neurologic Exam     Mental Status   Oriented to person, place, and time.     Cranial Nerves     CN III, IV, VI   Pupils are equal, round, and reactive to light.    Gait, Coordination, and Reflexes     Gait  Gait: normal      Physical Exam  Vitals reviewed.   Constitutional:       Appearance: Normal appearance. He is normal weight.   HENT:      Head: Normocephalic and atraumatic.      Right Ear: Hearing normal.      Left Ear: Hearing normal.      Nose: Nose normal.      Mouth/Throat:      Lips: Pink.      Mouth: Mucous membranes are moist.   Eyes:      General: Lids are normal.      Pupils: Pupils are equal, round, and reactive to light.   Cardiovascular:      Rate and Rhythm: Normal rate.   Pulmonary:      Effort: Pulmonary effort is normal.   Musculoskeletal:         General: Normal range of motion.      Cervical back: Normal range of motion.   Skin:     General: Skin is warm and dry.   Neurological:      Mental Status: He is alert and oriented to person, place, and time.      Gait: Gait is intact.   Psychiatric:         Attention and Perception: Attention normal.         Mood and Affect: Mood normal.         Behavior: Behavior is cooperative.         Assessment/Plan:      Diagnoses and all orders for this visit:    1. Migraine-cluster headache syndrome  -     SUMAtriptan (IMITREX) 50 MG tablet; Take one tablet at onset of headache. May repeat dose one time in 2 hours if headache not relieved.  Dispense: 18 tablet; Refill: 12  -      verapamil (CALAN) 40 MG tablet; TAKE 1 TABLET BY MOUTH DAILY FOR 2 WEEKS, THEN 2 TABLETS DAILY  Dispense: 60 tablet; Refill: 11    Continue : Oxygen 100% via a nonrebreathing facial mask with a flow rate of at least 12 L/minute for 15 min each cluster migriane      Return in about 1 year (around 4/18/2024) for Rosi Potts.     I spent 29 minutes caring for Navin on this date of service. This time includes time spent by me in the following activities: obtaining and/or reviewing a separately obtained history, performing a medically appropriate examination and/or evaluation, counseling and educating the patient/family/caregiver, ordering medications, tests, or procedures and documenting information in the medical record.      This document has been electronically signed by KEYUR De Los Santos on April 18, 2023 17:20 EDT

## 2023-04-18 ENCOUNTER — OFFICE VISIT (OUTPATIENT)
Dept: NEUROLOGY | Facility: CLINIC | Age: 38
End: 2023-04-18
Payer: COMMERCIAL

## 2023-04-18 VITALS
DIASTOLIC BLOOD PRESSURE: 68 MMHG | SYSTOLIC BLOOD PRESSURE: 109 MMHG | BODY MASS INDEX: 25.57 KG/M2 | WEIGHT: 210 LBS | HEART RATE: 65 BPM | HEIGHT: 76 IN

## 2023-04-18 DIAGNOSIS — G44.009 MIGRAINE-CLUSTER HEADACHE SYNDROME: ICD-10-CM

## 2023-04-18 PROCEDURE — 99213 OFFICE O/P EST LOW 20 MIN: CPT | Performed by: NURSE PRACTITIONER

## 2023-04-18 RX ORDER — SUMATRIPTAN 50 MG/1
TABLET, FILM COATED ORAL
Qty: 18 TABLET | Refills: 12 | Status: SHIPPED | OUTPATIENT
Start: 2023-04-18

## 2023-04-18 RX ORDER — VERAPAMIL HYDROCHLORIDE 40 MG/1
TABLET ORAL
Qty: 60 TABLET | Refills: 11 | Status: SHIPPED | OUTPATIENT
Start: 2023-04-18

## 2023-04-19 ENCOUNTER — TELEPHONE (OUTPATIENT)
Dept: NEUROLOGY | Facility: CLINIC | Age: 38
End: 2023-04-19
Payer: COMMERCIAL

## 2023-04-19 DIAGNOSIS — G44.009 MIGRAINE-CLUSTER HEADACHE SYNDROME: Primary | ICD-10-CM

## 2023-06-13 ENCOUNTER — OFFICE VISIT (OUTPATIENT)
Dept: FAMILY MEDICINE CLINIC | Facility: CLINIC | Age: 38
End: 2023-06-13
Payer: COMMERCIAL

## 2023-06-13 VITALS
RESPIRATION RATE: 18 BRPM | HEIGHT: 76 IN | HEART RATE: 81 BPM | BODY MASS INDEX: 25.16 KG/M2 | DIASTOLIC BLOOD PRESSURE: 66 MMHG | TEMPERATURE: 98 F | WEIGHT: 206.6 LBS | SYSTOLIC BLOOD PRESSURE: 108 MMHG | OXYGEN SATURATION: 98 %

## 2023-06-13 DIAGNOSIS — Z11.59 NEED FOR HEPATITIS C SCREENING TEST: ICD-10-CM

## 2023-06-13 DIAGNOSIS — Z72.0 TOBACCO USE: ICD-10-CM

## 2023-06-13 DIAGNOSIS — R51.9 INTRACTABLE HEADACHE, UNSPECIFIED CHRONICITY PATTERN, UNSPECIFIED HEADACHE TYPE: ICD-10-CM

## 2023-06-13 DIAGNOSIS — Z00.00 ANNUAL PHYSICAL EXAM: Primary | ICD-10-CM

## 2023-06-13 PROCEDURE — 99395 PREV VISIT EST AGE 18-39: CPT | Performed by: FAMILY MEDICINE

## 2023-06-13 NOTE — PROGRESS NOTES
Subjective   Navin Ocampo is a 38 y.o. male.   Chief Complaint   Patient presents with    Headache    Annual Exam       History of Present Illness  The patient is here: for coordination of medical care.  Patient has: moderate activity with work/home activities, good appetite, feels well with minor complaints, decreased energy level, and is sleeping poorly    Pneumococcal Vaccine 0-64(1 - PCV) Never done  TDAP/TD VACCINES(1 - Tdap) Never done  HEPATITIS C SCREENING Never done  ANNUAL PHYSICAL Never done  COVID-19 Vaccine(2 - Moderna series) due on 06/14/2021  INFLUENZA VACCINE due on 10/01/2023  Current pain scale 0/10.    Pt needs fmla for for migraines completed today  Headache  Headache pattern:  Headache sometimes there, sometimes not at all  Recent changes:  Headaches come more often than they used to and headaches last longer than they used to  Frequency:  More than 3 per week  Providers seen:  Primary care provider  Do headaches wake patient from sleep?: Yes    Days of the week symptoms are worse:  No specific day of the week  Quality:  Throbbing and squeezing  Aggravating factors:  Activity, light and noise  Abortive medications tried:  Sumatriptan  Treatments tried: imitrex.     The following portions of the patient's history were reviewed and updated as appropriate: allergies, current medications, past family history, past medical history, past social history, past surgical history, and problem list.    Patient Active Problem List   Diagnosis    Body mass index (BMI) of 25.0-25.9 in adult    Headache    Gross hematuria    Migraine without aura and without status migrainosus, not intractable    Tobacco use       Current Outpatient Medications on File Prior to Visit   Medication Sig Dispense Refill    azelastine (ASTEPRO) 0.15 % solution nasal spray 1 spray into the nostril(s) as directed by provider 2 (Two) Times a Day. 30 mL 5    cyanocobalamin (VITAMIN B-12) 250 MCG tablet Daily.      montelukast  "(SINGULAIR) 10 MG tablet TAKE 1 TABLET BY MOUTH EVERY DAY AT NIGHT 30 tablet 12    SUMAtriptan (IMITREX) 50 MG tablet Take one tablet at onset of headache. May repeat dose one time in 2 hours if headache not relieved. 18 tablet 12    verapamil (CALAN) 40 MG tablet TAKE 1 TABLET BY MOUTH DAILY FOR 2 WEEKS, THEN 2 TABLETS DAILY 60 tablet 11     No current facility-administered medications on file prior to visit.     Current outpatient and discharge medications have been reconciled for the patient.  Reviewed by: Caleb Pickard MD      No Known Allergies    Review of Systems   Constitutional:  Negative for activity change, appetite change, fatigue and fever.   HENT:  Negative for ear pain, swollen glands and voice change.    Eyes:  Negative for visual disturbance.   Respiratory:  Negative for shortness of breath and wheezing.    Cardiovascular:  Negative for chest pain and leg swelling.   Gastrointestinal:  Negative for abdominal pain, blood in stool, constipation, diarrhea, nausea and vomiting.   Endocrine: Negative for polydipsia and polyuria.   Genitourinary:  Negative for dysuria, frequency and hematuria.   Musculoskeletal:  Negative for joint swelling, neck pain and neck stiffness.   Skin:  Negative for rash and wound.   Neurological:  Negative for weakness, numbness and headache.   Psychiatric/Behavioral:  Negative for suicidal ideas and depressed mood.    I have reviewed and confirmed the accuracy of the ROS as documented by the MA/LPN/RN Caleb Pickard MD    Objective   Visit Vitals  /66 (BP Location: Right arm, Patient Position: Sitting, Cuff Size: Adult)   Pulse 81   Temp 98 °F (36.7 °C)   Resp 18   Ht 193 cm (76\")   Wt 93.7 kg (206 lb 9.6 oz)   SpO2 98%   BMI 25.15 kg/m²      **  Physical Exam  Constitutional:       Appearance: He is well-developed.   HENT:      Head: Normocephalic and atraumatic.      Right Ear: External ear normal.      Left Ear: External ear normal.      Nose: Nose " normal.   Eyes:      Pupils: Pupils are equal, round, and reactive to light.   Cardiovascular:      Rate and Rhythm: Normal rate and regular rhythm.      Heart sounds: Normal heart sounds.   Pulmonary:      Effort: Pulmonary effort is normal.      Breath sounds: Normal breath sounds.   Abdominal:      General: Bowel sounds are normal.      Palpations: Abdomen is soft.   Musculoskeletal:         General: Normal range of motion.      Cervical back: Normal range of motion and neck supple.   Skin:     General: Skin is warm and dry.   Neurological:      Mental Status: He is alert and oriented to person, place, and time.   Psychiatric:         Behavior: Behavior normal.         Thought Content: Thought content normal.         Judgment: Judgment normal.     Derm Physical Exam    Diagnoses and all orders for this visit:    1. Annual physical exam (Primary)  -     CBC & Differential  -     Comprehensive Metabolic Panel  -     Lipid Panel With / Chol / HDL Ratio  -     TSH  -     Hepatitis C Antibody    2. Intractable headache, unspecified chronicity pattern, unspecified headache type    3. Body mass index (BMI) of 25.0-25.9 in adult    4. Tobacco use    5. Need for hepatitis C screening test  -     Hepatitis C Antibody     Discussed anticipatory guidance, diet, exercise, and weight loss.  Discussed safety and routine screening examinations.  Discussed self-examinations.  Navin Ocampo  reports that he has been smoking cigarettes. He has a 15.00 pack-year smoking history. He has never used smokeless tobacco..  Follow-up for routine health maintenance as indicated.    BMI is >= 25 and <30. (Overweight) The following options were offered after discussion;: weight loss educational material (shared in after visit summary)      Expected course, medications, and adverse effects discussed as appropriate.  Call or return if worsening or persistent symptoms.     This document is intended for medical professional use only.

## 2023-08-21 ENCOUNTER — TELEPHONE (OUTPATIENT)
Dept: FAMILY MEDICINE CLINIC | Facility: CLINIC | Age: 38
End: 2023-08-21
Payer: COMMERCIAL

## 2023-10-13 ENCOUNTER — TELEPHONE (OUTPATIENT)
Dept: NEUROLOGY | Facility: CLINIC | Age: 38
End: 2023-10-13
Payer: COMMERCIAL

## 2023-10-13 DIAGNOSIS — G44.009 MIGRAINE-CLUSTER HEADACHE SYNDROME: Primary | ICD-10-CM

## 2023-10-13 NOTE — TELEPHONE ENCOUNTER
Provider: LALITHA GAMBINO    Caller: PATIENT    Relationship to Patient: SELF    Phone Number: [phone]    Reason for Call: PATIENT IS REQUESTING A CALL FROM PROVIDER. HE IS STILL HAVING ISSUES WITH HIS HEADACHES. HE IS ALSO LOOKING FOR A NEW PLACE TO GET HIS OXYGEN TANKS REFILLED IN [place], AS THE [place] LOCATION HAS BEEN OUT EVERY TIME HE GOES. PLEASE REVIEW AND ADVISE, THANK YOU.

## 2023-10-16 ENCOUNTER — TELEPHONE (OUTPATIENT)
Dept: NEUROLOGY | Facility: CLINIC | Age: 38
End: 2023-10-16
Payer: COMMERCIAL

## 2023-10-16 NOTE — TELEPHONE ENCOUNTER
PATIENT CALLING TO ADVISE, HE HAS GONE TO [facility] SEVERRAL TIMES (IN [place]) AND EACH TIME THEY HAVE NO TANKS TO REFILL.      PATIENT HAS TURNED IN TANK TO THEM TO AVOID [facility] CHARGED.  HE IS OUT OF OXYGEN AND BEGINNING TO HAVE HEADACHE    PLEASE PLACE A PRESCRIPTION FOR OXYGEN IN HIS MYCHART FOR FUTURE USES.    ALSO, PATIENT ASKING WHERE HE CAN NOW GO FOR OXYGEN.    ALSO, TRIED TO R/S APPT ON 4/22/24 AS REFERENCED ON SCHEDULE.  PATIENT NOW REQUIRING A THURSDAY APPT - [facility] HAS NO APPT THAT MEETS THIS - PLEASE ADVISE PATIENT.    THANK YOU

## 2023-10-17 DIAGNOSIS — G44.009 MIGRAINE-CLUSTER HEADACHE SYNDROME: ICD-10-CM

## 2023-10-17 RX ORDER — SUMATRIPTAN 50 MG/1
TABLET, FILM COATED ORAL
Qty: 18 TABLET | Refills: 12 | Status: SHIPPED | OUTPATIENT
Start: 2023-10-17

## 2023-10-17 NOTE — TELEPHONE ENCOUNTER
Caller: Navin Ocampo    Relationship: Self    Best call back number:     [phone]       Requested Prescriptions:   Requested Prescriptions     Pending Prescriptions Disp Refills    SUMAtriptan (IMITREX) 50 MG tablet 18 tablet 12     Sig: Take one tablet at onset of headache. May repeat dose one time in 2 hours if headache not relieved.        Pharmacy where request should be sent: [facility] [address] - [phone] [facility] [phone]      Last office visit with prescribing clinician: 6/13/2023   Last telemedicine visit with prescribing clinician: Visit date not found   Next office visit with prescribing clinician: Visit date not found     Additional details provided by patient: 30 TABLETS    Does the patient have less than a 3 day supply:  [x] Yes  [] No    Would you like a call back once the refill request has been completed: [x] Yes [] No    If the office needs to give you a call back, can they leave a voicemail: [x] Yes [] No    Reyes Melo Rep   10/17/23 14:56 EDT

## 2023-10-17 NOTE — TELEPHONE ENCOUNTER
Can you help with Oxygen.  He has Cluster Migraine and needs it     100% oxygen per a nonrebreather mask at a flow rate of 12 L/min for 15 minutes for each cluster migraine

## 2023-10-27 ENCOUNTER — TELEPHONE (OUTPATIENT)
Dept: NEUROLOGY | Facility: CLINIC | Age: 38
End: 2023-10-27

## 2023-10-27 NOTE — TELEPHONE ENCOUNTER
Caller: Navin Ocampo    Relationship: Self    Best call back number: [phone]    What was the call regarding: PATIENT RETURNED MISSED CALL. AFTER SPEAKING ROSA MARIA IN THE OFFICE SHE RECOMMENDED THE PATIENT CALL THE PHARMACY AND GOOD RX. PATIENT V/U AND STATED HE WILL CALL HIS PHARMACY

## 2023-10-27 NOTE — TELEPHONE ENCOUNTER
Provider: ANNE MOROHCO    Caller: SERGE    Phone Number: [phone]     Reason for Call: PT CALLED AND WOULD LIKE TO KNOW HOW MUCH THE EMGALILTY SHOT IS WITH OUT INSURANCE AS INSURANCE MY NOT COVER IT. PT STATES THAT CURRENT MEDICATION I NOT WORKING AND HE IS CURRENTLY IS PAIN.    PLEASE REVIEW AND ADVISE.  THANK YOU

## 2024-05-09 ENCOUNTER — OFFICE VISIT (OUTPATIENT)
Dept: NEUROLOGY | Facility: CLINIC | Age: 39
End: 2024-05-09
Payer: COMMERCIAL

## 2024-05-09 VITALS
WEIGHT: 220 LBS | HEART RATE: 63 BPM | DIASTOLIC BLOOD PRESSURE: 70 MMHG | BODY MASS INDEX: 26.79 KG/M2 | SYSTOLIC BLOOD PRESSURE: 117 MMHG | HEIGHT: 76 IN

## 2024-05-09 DIAGNOSIS — G44.011 INTRACTABLE EPISODIC CLUSTER HEADACHE: Primary | ICD-10-CM

## 2024-05-09 DIAGNOSIS — G44.009 MIGRAINE-CLUSTER HEADACHE SYNDROME: ICD-10-CM

## 2024-05-09 DIAGNOSIS — Z72.0 TOBACCO USE: ICD-10-CM

## 2024-05-09 RX ORDER — SUMATRIPTAN 50 MG/1
TABLET, FILM COATED ORAL
Qty: 18 TABLET | Refills: 12 | Status: SHIPPED | OUTPATIENT
Start: 2024-05-09

## 2024-05-10 ENCOUNTER — TELEPHONE (OUTPATIENT)
Dept: NEUROLOGY | Facility: CLINIC | Age: 39
End: 2024-05-10
Payer: COMMERCIAL

## 2024-10-23 ENCOUNTER — TELEPHONE (OUTPATIENT)
Dept: NEUROLOGY | Facility: CLINIC | Age: 39
End: 2024-10-23
Payer: COMMERCIAL

## 2024-10-23 DIAGNOSIS — G44.009 MIGRAINE-CLUSTER HEADACHE SYNDROME: ICD-10-CM

## 2024-10-23 RX ORDER — SUMATRIPTAN 50 MG/1
TABLET, FILM COATED ORAL
Qty: 18 TABLET | Refills: 11 | Status: SHIPPED | OUTPATIENT
Start: 2024-10-23 | End: 2024-10-24 | Stop reason: SDUPTHER

## 2024-10-23 NOTE — TELEPHONE ENCOUNTER
PATIENT CALLING TO ADVISE, HE CONTACTED HOSPITAL TO REFILL OXYGEN TANK.  THEY TELL HIM THEY NEEDS INFORMATION FROM OFFICE BEFORE THEY CAN FILL.    PT ASKING OFFICE TO CALL: [phone]

## 2024-10-23 NOTE — TELEPHONE ENCOUNTER
}    Medication requested (name and dose):      SUMAtriptan (IMITREX) 50 MG tablet   Take one tablet at onset of headache. May repeat dose one time in 2 hours if headache not relieved.,     Pharmacy where request should be sent:      [facility] [address] [place] [address] - [phone] [facility] [phone] [facility]     Additional details provided by patient:      Best call back number:  [phone]    Does the patient have less than a 3 day supply:  [] Yes  [x] No    Reyes Adams Rep  10/23/24, 12:47 EDT   [id number]}

## 2024-10-24 ENCOUNTER — OFFICE VISIT (OUTPATIENT)
Dept: FAMILY MEDICINE CLINIC | Facility: CLINIC | Age: 39
End: 2024-10-24
Payer: COMMERCIAL

## 2024-10-24 VITALS
SYSTOLIC BLOOD PRESSURE: 116 MMHG | RESPIRATION RATE: 18 BRPM | BODY MASS INDEX: 26.79 KG/M2 | WEIGHT: 220 LBS | OXYGEN SATURATION: 99 % | DIASTOLIC BLOOD PRESSURE: 76 MMHG | HEIGHT: 76 IN | TEMPERATURE: 97.7 F | HEART RATE: 98 BPM

## 2024-10-24 DIAGNOSIS — Z00.00 ANNUAL PHYSICAL EXAM: Primary | ICD-10-CM

## 2024-10-24 DIAGNOSIS — G44.011 INTRACTABLE EPISODIC CLUSTER HEADACHE: Primary | ICD-10-CM

## 2024-10-24 DIAGNOSIS — G44.009 MIGRAINE-CLUSTER HEADACHE SYNDROME: ICD-10-CM

## 2024-10-24 PROCEDURE — 99396 PREV VISIT EST AGE 40-64: CPT | Performed by: FAMILY MEDICINE

## 2024-10-24 RX ORDER — SUMATRIPTAN 50 MG/1
TABLET, FILM COATED ORAL
Qty: 18 TABLET | Refills: 11 | Status: SHIPPED | OUTPATIENT
Start: 2024-10-24 | End: 2024-10-24

## 2024-10-24 RX ORDER — SUMATRIPTAN 50 MG/1
TABLET, FILM COATED ORAL
Qty: 54 TABLET | Refills: 3 | Status: SHIPPED | OUTPATIENT
Start: 2024-10-24

## 2024-10-24 NOTE — PROGRESS NOTES
Subjective   Navin Ocampo is a 39 y.o. male.   Chief Complaint   Patient presents with    Headache       Headache  Headache pattern:  Headache sometimes there, sometimes not at all  Initial event:  None  Recent changes:  No recent change in headache pattern  Frequency:  More than 3 per week  Providers seen:  Neurologist  Lifetime total:  20+  Longest time without a headache:  Months       The following portions of the patient's history were reviewed and updated as appropriate: allergies, current medications, past family history, past medical history, past social history, past surgical history, and problem list.    Patient Active Problem List   Diagnosis    Body mass index (BMI) of 25.0-25.9 in adult    Headache    Gross hematuria    Migraine without aura and without status migrainosus, not intractable    Tobacco use    Migraine-cluster headache syndrome       Current Outpatient Medications on File Prior to Visit   Medication Sig Dispense Refill    cyanocobalamin (VITAMIN B-12) 250 MCG tablet Daily.      [DISCONTINUED] SUMAtriptan (IMITREX) 50 MG tablet Take one tablet at onset of headache. May repeat dose one time in 2 hours if headache not relieved. 18 tablet 11    [DISCONTINUED] azelastine (ASTEPRO) 0.15 % solution nasal spray 1 spray into the nostril(s) as directed by provider 2 (Two) Times a Day. (Patient not taking: Reported on 10/24/2024) 30 mL 5    [DISCONTINUED] montelukast (SINGULAIR) 10 MG tablet TAKE 1 TABLET BY MOUTH EVERY DAY AT NIGHT (Patient not taking: Reported on 10/24/2024) 30 tablet 12     No current facility-administered medications on file prior to visit.     Current outpatient and discharge medications have been reconciled for the patient.  Reviewed by: Caleb Pickard MD      No Known Allergies    Review of Systems   Constitutional:  Negative for activity change, appetite change, fatigue and fever.   HENT:  Negative for ear pain, swollen glands and voice change.    Eyes:  Negative for  "visual disturbance.   Respiratory:  Negative for shortness of breath and wheezing.    Cardiovascular:  Negative for chest pain and leg swelling.   Gastrointestinal:  Negative for abdominal pain, blood in stool, constipation, diarrhea, nausea and vomiting.   Endocrine: Negative for polydipsia and polyuria.   Genitourinary:  Negative for dysuria, frequency and hematuria.   Musculoskeletal:  Negative for joint swelling, neck pain and neck stiffness.   Skin:  Negative for rash and wound.   Neurological:  Positive for headache. Negative for weakness and numbness.   Psychiatric/Behavioral:  Negative for suicidal ideas and depressed mood.      I have reviewed and confirmed the accuracy of the ROS as documented by the MA/LPN/RN Caleb Pickard MD    Objective   Visit Vitals  /76 (BP Location: Left arm, Patient Position: Sitting, Cuff Size: Adult)   Pulse 98   Temp 97.7 °F (36.5 °C) (Temporal)   Resp 18   Ht 193 cm (76\")   Wt 99.8 kg (220 lb)   SpO2 99%   BMI 26.78 kg/m²      **  Physical Exam  Constitutional:       Appearance: He is well-developed.   HENT:      Head: Normocephalic and atraumatic.      Right Ear: External ear normal.      Left Ear: External ear normal.      Nose: Nose normal.   Eyes:      Pupils: Pupils are equal, round, and reactive to light.   Cardiovascular:      Rate and Rhythm: Normal rate and regular rhythm.      Heart sounds: Normal heart sounds.   Pulmonary:      Effort: Pulmonary effort is normal.      Breath sounds: Normal breath sounds.   Abdominal:      General: Bowel sounds are normal.      Palpations: Abdomen is soft.   Musculoskeletal:         General: Normal range of motion.      Cervical back: Normal range of motion and neck supple.   Skin:     General: Skin is warm and dry.   Neurological:      Mental Status: He is alert and oriented to person, place, and time.   Psychiatric:         Behavior: Behavior normal.         Thought Content: Thought content normal.         Judgment: " Judgment normal.       Derm Physical Exam  Ortho Exam   Neurological Exam  Mental Status  Alert. Oriented to person, place, and time.    Cranial Nerves  CN III, IV, VI: Pupils equal round and reactive to light bilaterally.         Diagnoses and all orders for this visit:    1. Annual physical exam (Primary)  -     CBC & Differential  -     Comprehensive Metabolic Panel  -     TSH  -     Lipid Panel With / Chol / HDL Ratio    2. Migraine-cluster headache syndrome  -     Discontinue: SUMAtriptan (IMITREX) 50 MG tablet; Take one tablet at onset of headache. May repeat dose one time in 2 hours if headache not relieved.  Dispense: 18 tablet; Refill: 11  -     SUMAtriptan (IMITREX) 50 MG tablet; Take one tablet at onset of headache. May repeat dose one time in 2 hours if headache not relieved.  Dispense: 18 tablet; Refill: 11     Discussed anticipatory guidance, diet, exercise, and weight loss.  Discussed safety and routine screening examinations.  Discussed self-examinations.  Navin Ocampo  reports that he has been smoking cigarettes. He has a 15 pack-year smoking history. He has never used smokeless tobacco. Follow-up for routine health maintenance as indicated.   BMI is >= 25 and <30. (Overweight) The following options were offered after discussion;: weight loss educational material (shared in after visit summary)      Expected course, medications, and adverse effects discussed as appropriate.  Call or return if worsening or persistent symptoms.     This document is intended for medical professional use only.   Electronically signed by Caleb Pickard MD on 10/24/2024. This content may not have been proofread.

## 2025-06-03 ENCOUNTER — TELEPHONE (OUTPATIENT)
Dept: NEUROLOGY | Facility: CLINIC | Age: 40
End: 2025-06-03
Payer: COMMERCIAL

## 2025-06-03 NOTE — TELEPHONE ENCOUNTER
Patient was notified by PAC that an annual visit is required for him to get prescription for the O2 as it is a prescription.

## 2025-06-03 NOTE — TELEPHONE ENCOUNTER
Provider: PHILIP BECK / LALITHA GAMBINO    Caller: Navin Ocampo    Relationship to Patient: Self    Pharmacy: [facility]    Phone Number: [phone]    Reason for Call: STATES HE WAS ADVISED HE NEEDED AN APPT BEFORE HE COULD GET A REFILL ON HIS OXYGEN. USES [facility]. SCHEDULED FOR 6/10/25 @ 4 PM. PATIENT STATES HE HAS TO WORK BUT WILL CALL IN IF THE APPT IS ABSOLUTELY NEEDED. WOULD LIKE TO SEE IF HE COULD GET OXYGEN SENT IN WITHOUT COMING IN & POSSIBLY RESCHEDULING OUT TO NEXT AVAILABLE IN SEPT/OCTOBER. PLEASE ADVISE, THANK YOU.

## 2025-06-09 NOTE — PROGRESS NOTES
Subjective: Cluster migraine    Patient ID: Navin Ocampo is a 40 y.o. male.    History of Present Illness Mr. Arroyo is a 40-year-old  male who has been followed by KEYUR Samson for cluster migraine.  His current cluster migraine meds include: Sumatriptan 50 mg and O2 .  Usual flareups are from March to August.      The patient states taking 25 to 50 mg of sumatriptan will stop the cluster.  100% O2 will also stop the cluster.  He is here today to get refills on both.  He gets the sumatriptan from [email or web address] store.  The 100% O2 he gets from a local  hospital in [place].    He has been on verapamil in the past but only 40 mg twice a day and did not feel it was beneficial.  I notified the patient I usually use in long-acting 80 to 120 mg of the verapamil for a good benefit.  The patient states he would like to hold on the verapamil right now.            The following portions of the patient's history were reviewed and updated as appropriate: allergies, current medications, past family history, past medical history, past social history, past surgical history and problem list.    Family History   Problem Relation Age of Onset    No Known Problems Mother     Hypertension Father        Past Medical History:   Diagnosis Date    Headaches, cluster        Social History     Socioeconomic History    Marital status: Single   Tobacco Use    Smoking status: Every Day     Current packs/day: 1.00     Average packs/day: 1 pack/day for 15.0 years (15.0 ttl pk-yrs)     Types: Cigarettes    Smokeless tobacco: Never   Vaping Use    Vaping status: Never Used   Substance and Sexual Activity    Alcohol use: Not Currently     Comment: occ    Drug use: Yes     Types: Marijuana    Sexual activity: Yes         Current Outpatient Medications:     cyanocobalamin (VITAMIN B-12) 250 MCG tablet, Daily., Disp: , Rfl:     SUMAtriptan (IMITREX) 50 MG tablet, Take one tablet at onset of headache. May repeat dose one time in 2 hours  if headache not relieved., Disp: 54 tablet, Rfl: 3    Review of Systems       I have reviewed ROS completed by medical assistant.     Objective:      Neurological Exam  Mental Status  Awake and alert. Oriented only to person, place, time and situation. Speech is normal. Language is fluent with no aphasia. Attention and concentration are normal. Fund of knowledge is appropriate for level of education.    Cranial Nerves  CN II: Right visual acuity: Normal. Left visual acuity: Normal. Right normal visual field. Left normal visual field.  CN III, IV, VI: Extraocular movements intact bilaterally. No nystagmus. Normal saccades. Normal smooth pursuit.   Right pupil: 2 mm.   Left pupil: 2 mm.  CN VII:  Right: There is no facial weakness.  Left: There is no facial weakness.    Motor  Normal muscle bulk throughout.    Gait  Casual gait is normal including stance, stride, and arm swing.         Assessment/Plan:  I also ordered the patient's 100% O2 through a nonrebreather mask for at 12 L/min for 15 minutes for each cluster x 1 year.  He is to call if his cluster migraines are not controlled with this or the sumatriptan.       Diagnoses and all orders for this visit:    1. Intractable episodic cluster headache (Primary)    2. Migraine-cluster headache syndrome  -     SUMAtriptan (IMITREX) 50 MG tablet; Take one tablet at onset of headache. May repeat dose one time in 2 hours if headache not relieved.  Dispense: 54 tablet; Refill: 3          Return in about 6 months (around 12/10/2025) for Next scheduled follow up  Thursday with Sheron.     I spent 21 minutes caring for Navin on this date of service. This time includes time spent by me in the following activities: preparing for the visit, reviewing tests, obtaining and/or reviewing a separately obtained history, performing a medically appropriate examination and/or evaluation, counseling and educating the patient/family/caregiver, ordering medications, tests, or procedures,  and documenting information in the medical record.      This document has been electronically signed by KEYUR De Los Santos on Dania 10, 2025 16:11 EDT

## 2025-06-10 ENCOUNTER — OFFICE VISIT (OUTPATIENT)
Dept: NEUROLOGY | Facility: CLINIC | Age: 40
End: 2025-06-10
Payer: COMMERCIAL

## 2025-06-10 VITALS
SYSTOLIC BLOOD PRESSURE: 126 MMHG | HEIGHT: 76 IN | HEART RATE: 85 BPM | DIASTOLIC BLOOD PRESSURE: 74 MMHG | WEIGHT: 228 LBS | BODY MASS INDEX: 27.76 KG/M2

## 2025-06-10 DIAGNOSIS — G44.011 INTRACTABLE EPISODIC CLUSTER HEADACHE: Primary | ICD-10-CM

## 2025-06-10 DIAGNOSIS — G44.009 MIGRAINE-CLUSTER HEADACHE SYNDROME: ICD-10-CM

## 2025-06-10 PROCEDURE — 99213 OFFICE O/P EST LOW 20 MIN: CPT | Performed by: NURSE PRACTITIONER

## 2025-06-10 RX ORDER — SUMATRIPTAN 50 MG/1
TABLET, FILM COATED ORAL
Qty: 54 TABLET | Refills: 3 | Status: SHIPPED | OUTPATIENT
Start: 2025-06-10

## 2025-06-11 ENCOUNTER — TELEPHONE (OUTPATIENT)
Dept: NEUROLOGY | Facility: CLINIC | Age: 40
End: 2025-06-11
Payer: COMMERCIAL

## 2025-06-11 DIAGNOSIS — G44.011 INTRACTABLE EPISODIC CLUSTER HEADACHE: Primary | ICD-10-CM

## 2025-06-11 DIAGNOSIS — G44.009 MIGRAINE-CLUSTER HEADACHE SYNDROME: ICD-10-CM
